# Patient Record
Sex: FEMALE | Race: BLACK OR AFRICAN AMERICAN | NOT HISPANIC OR LATINO | Employment: UNEMPLOYED | URBAN - METROPOLITAN AREA
[De-identification: names, ages, dates, MRNs, and addresses within clinical notes are randomized per-mention and may not be internally consistent; named-entity substitution may affect disease eponyms.]

---

## 2020-04-08 ENCOUNTER — HISTORICAL (OUTPATIENT)
Dept: ADMINISTRATIVE | Facility: HOSPITAL | Age: 20
End: 2020-04-08

## 2020-04-10 LAB — FINAL CULTURE: NO GROWTH

## 2020-09-01 ENCOUNTER — HISTORICAL (OUTPATIENT)
Dept: ADMINISTRATIVE | Facility: HOSPITAL | Age: 20
End: 2020-09-01

## 2020-09-04 LAB — FINAL CULTURE: NORMAL

## 2022-04-10 ENCOUNTER — HISTORICAL (OUTPATIENT)
Dept: ADMINISTRATIVE | Facility: HOSPITAL | Age: 22
End: 2022-04-10

## 2022-04-29 VITALS
WEIGHT: 293 LBS | BODY MASS INDEX: 45.99 KG/M2 | SYSTOLIC BLOOD PRESSURE: 128 MMHG | DIASTOLIC BLOOD PRESSURE: 66 MMHG | HEIGHT: 67 IN | OXYGEN SATURATION: 98 %

## 2022-10-25 ENCOUNTER — HOSPITAL ENCOUNTER (EMERGENCY)
Facility: HOSPITAL | Age: 22
Discharge: HOME | End: 2022-10-25
Payer: MEDICAID

## 2022-10-25 VITALS
TEMPERATURE: 99 F | SYSTOLIC BLOOD PRESSURE: 113 MMHG | DIASTOLIC BLOOD PRESSURE: 69 MMHG | HEART RATE: 80 BPM | RESPIRATION RATE: 18 BRPM

## 2022-10-25 LAB
FLUAV AG UPPER RESP QL IA.RAPID: NOT DETECTED
FLUBV AG UPPER RESP QL IA.RAPID: NOT DETECTED
SARS-COV-2 RNA RESP QL NAA+PROBE: NOT DETECTED
STREP A PCR (OHS): NOT DETECTED

## 2022-10-25 PROCEDURE — 99283 EMERGENCY DEPT VISIT LOW MDM: CPT

## 2022-10-25 PROCEDURE — 87651 STREP A DNA AMP PROBE: CPT | Performed by: PHYSICIAN ASSISTANT

## 2022-10-25 PROCEDURE — 0241U COVID/FLU A&B PCR: CPT | Performed by: PHYSICIAN ASSISTANT

## 2022-10-25 NOTE — FIRST PROVIDER EVALUATION
Medical screening examination initiated.  I have conducted a focused provider triage encounter, findings are as follows:    Brief history of present illness:  23 yo female presents to ED for evaluation of cough, congestion, and sorethroat for the past several days.     Vitals:    10/25/22 1314   BP: 113/69   Pulse: 80   Resp: 18   Temp: 98.6 °F (37 °C)   TempSrc: Oral       Pertinent physical exam:  Patient is awake and alert and oriented.  Ambulatory to triage.  In no acute distress.        Brief workup plan:  COVID/flu, strep    Preliminary workup initiated; this workup will be continued and followed by the physician or advanced practice provider that is assigned to the patient when roomed.

## 2024-04-23 ENCOUNTER — HOSPITAL ENCOUNTER (EMERGENCY)
Facility: HOSPITAL | Age: 24
Discharge: HOME | End: 2024-04-23
Attending: EMERGENCY MEDICINE
Payer: MEDICAID

## 2024-04-23 VITALS
HEART RATE: 72 BPM | OXYGEN SATURATION: 100 % | SYSTOLIC BLOOD PRESSURE: 132 MMHG | DIASTOLIC BLOOD PRESSURE: 85 MMHG | BODY MASS INDEX: 29.55 KG/M2 | WEIGHT: 195 LBS | TEMPERATURE: 99 F | HEIGHT: 68 IN | RESPIRATION RATE: 18 BRPM

## 2024-04-23 DIAGNOSIS — S00.83XA CONTUSION OF FACE, INITIAL ENCOUNTER: ICD-10-CM

## 2024-04-23 DIAGNOSIS — S76.011A HIP STRAIN, RIGHT, INITIAL ENCOUNTER: ICD-10-CM

## 2024-04-23 DIAGNOSIS — S40.011A CONTUSION OF RIGHT SHOULDER, INITIAL ENCOUNTER: ICD-10-CM
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
## 2024-04-23 DIAGNOSIS — S00.11XA PERIORBITAL ECCHYMOSIS OF RIGHT EYE, INITIAL ENCOUNTER: ICD-10-CM

## 2024-04-23 LAB
B-HCG UR QL: NEGATIVE
CTP QC/QA: YES

## 2024-04-23 PROCEDURE — 25000003 PHARM REV CODE 250: Performed by: EMERGENCY MEDICINE

## 2024-04-23 PROCEDURE — 99285 EMERGENCY DEPT VISIT HI MDM: CPT | Mod: 25

## 2024-04-23 RX ORDER — KETOROLAC TROMETHAMINE 10 MG/1
10 TABLET, FILM COATED ORAL
Status: COMPLETED | OUTPATIENT
Start: 2024-04-23 | End: 2024-04-23

## 2024-04-23 RX ORDER — KETOROLAC TROMETHAMINE 30 MG/ML
30 INJECTION, SOLUTION INTRAMUSCULAR; INTRAVENOUS
Status: DISCONTINUED | OUTPATIENT
Start: 2024-04-23 | End: 2024-04-23

## 2024-04-23 RX ORDER — KETOROLAC TROMETHAMINE 10 MG/1
10 TABLET, FILM COATED ORAL EVERY 6 HOURS PRN
Qty: 20 TABLET | Refills: 0 | Status: SHIPPED | OUTPATIENT
Start: 2024-04-23 | End: 2024-04-28

## 2024-04-23 RX ORDER — HYDROCODONE BITARTRATE AND ACETAMINOPHEN 5; 325 MG/1; MG/1
1 TABLET ORAL EVERY 4 HOURS PRN
Qty: 12 TABLET | Refills: 0 | Status: SHIPPED | OUTPATIENT
Start: 2024-04-23 | End: 2024-04-26

## 2024-04-23 RX ADMIN — KETOROLAC TROMETHAMINE 10 MG: 10 TABLET, FILM COATED ORAL at 11:04

## 2024-04-23 NOTE — LETTER
"Rodi "Michelle"Jerald was seen and treated in our emergency department on 4/23/2024.  She may return to work on 04/26/2024.       If you have any questions or concerns, please don't hesitate to call.      trent oro RN    "

## 2024-04-23 NOTE — ED PROVIDER NOTES
Encounter Date: 4/23/2024    SCRIBE #1 NOTE: I, Skip Rodriguez, am scribing for, and in the presence of,  Joellen Blake MD. I have scribed the following portions of the note - Other sections scribed: HPI, ROS.       History     Chief Complaint   Patient presents with    Fall     Ambulatory into triage; reports fall from horse on Sunday. Presents with bruising to right face, reports scattered right sided brush burns. States presents to ED due to increased right hip pain causing her to limp and right shoulder skin burning from abrasion. GCS 15. No obvious deformities.     23 year old female with no significant pmhx presents to the ED for a fall that occurred on Sunday, 4/21.  The patient reports associated symptoms of pain in her right cheek, chin, right shoulder, and right hip.  The patient reports that she was riding a horse when the horse took off and lost control.  She states that she fell off the side of the horse onto the road, where she impacted her entire right side.  She reports that her chin was bleeding after the event.  She states that this occurred next to a nursing home where they sterilized her wound and she found a ride home.      The history is provided by the patient. No  was used.   Fall  The accident occurred two days ago. The point of impact was the right hip, right shoulder and face. The pain is present in the right hip, face and right shoulder.     Review of patient's allergies indicates:   Allergen Reactions    Pcn [penicillins] Itching     Past Medical History:   Diagnosis Date    No pertinent past medical history      No past surgical history on file.  No family history on file.     Review of Systems   Musculoskeletal:         Pain in the right cheek, chin, right shoulder, and right hip       Physical Exam     Initial Vitals [04/23/24 0901]   BP Pulse Resp Temp SpO2   129/84 67 14 98.6 °F (37 °C) 98 %      MAP       --         Physical Exam    Nursing note and  vitals reviewed.  Constitutional: She appears well-developed and well-nourished. She is not diaphoretic. She does not appear ill. No distress.   HENT:   Head: Normocephalic.   Right Ear: External ear normal.   Left Ear: External ear normal.   Mouth/Throat: Oropharynx is clear and moist.   Right periorbital swelling and ecchymosis that extends to right cheek with tenderness, small puncture wound to cheek with scab, small abrasions and ecchymosis to submandibular area    Eyes: Conjunctivae and EOM are normal. Pupils are equal, round, and reactive to light.   Neck: Neck supple. No tracheal deviation present.   No cervical spine tenderness    Normal range of motion.  Cardiovascular:  Normal rate, regular rhythm and normal heart sounds.           No murmur heard.  Pulmonary/Chest: Breath sounds normal. No respiratory distress. She has no wheezes.   Abdominal: Abdomen is soft. She exhibits no distension. There is no abdominal tenderness.   No right CVA tenderness.  No left CVA tenderness.   Musculoskeletal:      Cervical back: Normal range of motion and neck supple.      Comments: Large abrasion to right shoulder, mild swelling and trace ecchymosis to right anterior shoulder, pain with ROM of right shoulder     Tenderness to right hip      Neurological: She is alert and oriented to person, place, and time. GCS score is 15. GCS eye subscore is 4. GCS verbal subscore is 5. GCS motor subscore is 6.   Skin: Skin is warm and dry. Capillary refill takes less than 2 seconds. No rash noted. No pallor.         ED Course   Procedures  Labs Reviewed   POCT URINE PREGNANCY          Imaging Results              CT Maxillofacial Without Contrast (Final result)  Result time 04/23/24 11:25:31      Final result by Angel Persaud MD (04/23/24 11:25:31)                   Impression:      No facial fractures.      Electronically signed by: Angel Persaud  Date:    04/23/2024  Time:    11:25               Narrative:    EXAMINATION:  CT  MAXILLOFACIAL WITHOUT CONTRAST    CLINICAL HISTORY:  Facial trauma, blunt;    TECHNIQUE:  Helical acquisition through the maxillofacial bones without IV contrast. Three plane reconstructions were made available for review.  mGycm. Automatic exposure control, adjustment of mA/kV or iterative reconstruction technique was used to reduce radiation.    COMPARISON:  None available.    FINDINGS:  No facial fractures are identified. Paranasal sinuses and mastoid air cells are clear.                                       CT Head Without Contrast (Final result)  Result time 04/23/24 11:22:14      Final result by Suma Ashley MD (04/23/24 11:22:14)                   Impression:      No acute intracranial abnormality.      Electronically signed by: Suma Ashley  Date:    04/23/2024  Time:    11:22               Narrative:    EXAMINATION:  CT HEAD WITHOUT CONTRAST    CLINICAL HISTORY:  Facial trauma, blunt;    TECHNIQUE:  Axial scans were obtained from skull base to the vertex.    Coronal and sagittal reconstructions obtained from the axial data.    Automatic exposure control was utilized to limit radiation dose.    Contrast: None    Radiation Dose:    Total DLP: 991 mGy*cm    COMPARISON:  None    FINDINGS:  There is no acute intracranial hemorrhage or edema. The gray-white matter differentiation is preserved.    There is no mass effect or midline shift. The ventricles and sulci are normal in size. The basal cisterns are patent. There is no abnormal extra-axial fluid collection.    The calvarium and skull base are intact. The visualized paranasal sinuses and the mastoid air cells are clear.                                       X-Ray Hip 2 or 3 views Right (with Pelvis when performed) (Final result)  Result time 04/23/24 11:05:39      Final result by Fernie Horton MD (04/23/24 11:05:39)                   Impression:      No abnormalities are demonstrated.      Electronically signed by: Fernie Horton  MD  Date:    04/23/2024  Time:    11:05               Narrative:    EXAMINATION:  XR HIP WITH PELVIS WHEN PERFORMED, 2 OR 3  VIEWS RIGHT    CLINICAL HISTORY:  right hip pain;    TECHNIQUE:  AP view of the pelvis and frog leg lateral view of the right hip were performed.    COMPARISON:  None    FINDINGS:  There are no fractures seen.  There is no dislocation.  There are no bony lesions noted.                                       X-ray Shoulder 2 or More Views Right (Final result)  Result time 04/23/24 11:04:00      Final result by Fernie Horton MD (04/23/24 11:04:00)                   Impression:      No acute abnormalities are seen      Electronically signed by: Fernie Horton MD  Date:    04/23/2024  Time:    11:04               Narrative:    EXAMINATION:  XR SHOULDER COMPLETE 2 OR MORE VIEWS RIGHT    CLINICAL HISTORY:  right shoulder;    TECHNIQUE:  Two or three views of the right shoulder were performed.    COMPARISON:  None    FINDINGS:  There are no fractures seen.  There is no dislocation.  There are no bony lesions noted.                                    X-Rays:   Independently Interpreted Readings:   Other Readings:  XR right shoulder: no acute fracture or dislocation  XR right hip: no acute fracture or dislocation     Medications   ketorolac tablet 10 mg (10 mg Oral Given 4/23/24 1131)     Medical Decision Making  The differential diagnoses includes but is not limited to: fracture, contusion, dislocation.      XR and CT without fracture  Rx Toradol and [place]      Problems Addressed:  Contusion of face, initial encounter: acute illness or injury that poses a threat to life or bodily functions  Contusion of right shoulder, initial encounter: acute illness or injury that poses a threat to life or bodily functions  Fall from horse, initial encounter: acute illness or injury that poses a threat to life or bodily functions  Hip strain, right, initial encounter: acute illness or injury that poses a threat to  life or bodily functions  Periorbital ecchymosis of right eye, initial encounter: acute illness or injury that poses a threat to life or bodily functions    Amount and/or Complexity of Data Reviewed  Radiology: ordered and independent interpretation performed. Decision-making details documented in ED Course.    Risk  Prescription drug management.            Scribe Attestation:   Scribe #1: I performed the above scribed service and the documentation accurately describes the services I performed. I attest to the accuracy of the note.    Attending Attestation:           Physician Attestation for Scribe:  Physician Attestation Statement for Scribe #1: I, Joellen Blake MD, reviewed documentation, as scribed by Skip Rodriguez in my presence, and it is both accurate and complete.                                    Clinical Impression:  Final diagnoses:  [V80.010A] Fall from horse, initial encounter (Primary)  [S00.83XA] Contusion of face, initial encounter  [S00.11XA] Periorbital ecchymosis of right eye, initial encounter  [S40.011A] Contusion of right shoulder, initial encounter  [S76.011A] Hip strain, right, initial encounter          ED Disposition Condition    Discharge Stable          ED Prescriptions       Medication Sig Dispense Start Date End Date Auth. Provider    ketorolac (TORADOL) 10 mg tablet Take 1 tablet (10 mg total) by mouth every 6 (six) hours as needed for Pain. Do not take any other NSAIDs 20 tablet 4/23/2024 4/28/2024 Joellen Blake MD    HYDROcodone-acetaminophen (NORCO) 5-325 mg per tablet Take 1 tablet by mouth every 4 (four) hours as needed for Pain. 12 tablet 4/23/2024 4/26/2024 Joellen Blake MD          Follow-up Information       Follow up With Specialties Details Why Contact Info    [facility] - Emergency Dept Emergency Medicine  As needed, If symptoms worsen [address] [address] [id number] [phone]             Joellen Blake MD  04/23/24  1213

## 2024-07-08 ENCOUNTER — LAB VISIT (OUTPATIENT)
Dept: LAB | Facility: HOSPITAL | Age: 24
End: 2024-07-08
Attending: OBSTETRICS & GYNECOLOGY
Payer: MEDICAID

## 2024-07-08 DIAGNOSIS — E34.9 ENDOCRINE DISORDER RELATED TO PUBERTY: Primary | ICD-10-CM

## 2024-07-08 LAB — T PALLIDUM AB SER QL: NONREACTIVE

## 2024-07-08 PROCEDURE — 80074 ACUTE HEPATITIS PANEL: CPT

## 2024-07-08 PROCEDURE — 87389 HIV-1 AG W/HIV-1&-2 AB AG IA: CPT

## 2024-07-08 PROCEDURE — 86780 TREPONEMA PALLIDUM: CPT

## 2024-07-08 PROCEDURE — 36415 COLL VENOUS BLD VENIPUNCTURE: CPT

## 2024-07-08 PROCEDURE — 86695 HERPES SIMPLEX TYPE 1 TEST: CPT

## 2024-07-09 LAB
HAV IGM SERPL QL IA: NONREACTIVE
HBV CORE IGM SERPL QL IA: NONREACTIVE
HBV SURFACE AG SERPL QL IA: NONREACTIVE
HCV AB SERPL QL IA: NONREACTIVE
HIV 1+2 AB+HIV1 P24 AG SERPL QL IA: NONREACTIVE

## 2024-07-10 LAB
HSV1 IGG SERPL QL IA: NEGATIVE
HSV2 IGG SERPL QL IA: NEGATIVE
PATH REV: NORMAL

## 2025-03-07 ENCOUNTER — HOSPITAL ENCOUNTER (EMERGENCY)
Facility: HOSPITAL | Age: 25
Discharge: HOME | End: 2025-03-07
Attending: INTERNAL MEDICINE
Payer: MEDICAID

## 2025-03-07 VITALS
TEMPERATURE: 98 F | HEIGHT: 68 IN | DIASTOLIC BLOOD PRESSURE: 57 MMHG | RESPIRATION RATE: 16 BRPM | BODY MASS INDEX: 30.92 KG/M2 | SYSTOLIC BLOOD PRESSURE: 100 MMHG | OXYGEN SATURATION: 100 % | HEART RATE: 60 BPM | WEIGHT: 204 LBS

## 2025-03-07 DIAGNOSIS — O23.599 BACTERIAL VAGINOSIS IN PREGNANCY: ICD-10-CM

## 2025-03-07 DIAGNOSIS — O21.0 HYPEREMESIS GRAVIDARUM: Primary | ICD-10-CM

## 2025-03-07 DIAGNOSIS — B96.89 BACTERIAL VAGINOSIS IN PREGNANCY: ICD-10-CM

## 2025-03-07 LAB
BACTERIA #/AREA URNS AUTO: ABNORMAL /HPF
BILIRUB UR QL STRIP.AUTO: NEGATIVE
CLARITY UR: ABNORMAL
CLUE CELLS VAG QL WET PREP: ABNORMAL
COLOR UR AUTO: ABNORMAL
GLUCOSE UR QL STRIP: NORMAL
HGB UR QL STRIP: NEGATIVE
HYALINE CASTS #/AREA URNS LPF: ABNORMAL /LPF
KETONES UR QL STRIP: NEGATIVE
LEUKOCYTE ESTERASE UR QL STRIP: 250
NITRITE UR QL STRIP: NEGATIVE
PH UR STRIP: 7 [PH]
PROT UR QL STRIP: NEGATIVE
RBC #/AREA URNS AUTO: ABNORMAL /HPF
SP GR UR STRIP.AUTO: 1.01 (ref 1–1.03)
SQUAMOUS #/AREA URNS LPF: ABNORMAL /HPF
T VAGINALIS VAG QL WET PREP: ABNORMAL
UROBILINOGEN UR STRIP-ACNC: NORMAL
WBC #/AREA URNS AUTO: ABNORMAL /HPF
WBC #/AREA VAG WET PREP: ABNORMAL
YEAST SPEC QL WET PREP: ABNORMAL

## 2025-03-07 PROCEDURE — 25000003 PHARM REV CODE 250

## 2025-03-07 PROCEDURE — 99283 EMERGENCY DEPT VISIT LOW MDM: CPT

## 2025-03-07 PROCEDURE — 81001 URINALYSIS AUTO W/SCOPE: CPT

## 2025-03-07 PROCEDURE — 87210 SMEAR WET MOUNT SALINE/INK: CPT

## 2025-03-07 RX ORDER — ONDANSETRON 4 MG/1
4 TABLET, ORALLY DISINTEGRATING ORAL
Status: COMPLETED | OUTPATIENT
Start: 2025-03-07 | End: 2025-03-07

## 2025-03-07 RX ORDER — METRONIDAZOLE 7.5 MG/G
1 GEL VAGINAL 2 TIMES DAILY
Qty: 70 G | Refills: 0 | Status: SHIPPED | OUTPATIENT
Start: 2025-03-07 | End: 2025-03-14

## 2025-03-07 RX ADMIN — ONDANSETRON 4 MG: 4 TABLET, ORALLY DISINTEGRATING ORAL at 12:03

## 2025-03-07 NOTE — DISCHARGE INSTRUCTIONS
your prescription of zofran from the drug store  Use a pressure-point wrist band or acupuncture as directed by your doctor. Pressure-points and acupuncture may help an upset stomach.  Eat small meals or snacks slowly, every 1 to 2 hours. Try to eat before you feel hungry or as soon as you start to feel hungry. Having an empty stomach can make nausea worse.  It may help to eat a snack in the middle of the night, or first thing in the morning before getting out of bed. Try eating things like nuts, toast, pretzels, crackers, or cereal. Peppermints, peppermint tea, and foods with nikhil may also help. Some people prefer cold food over hot as there are fewer odors.  Keep track of what you eat. This can help you find out what foods you tolerate best. You may want to avoid spicy, fatty, and sweet foods. You can also keep track of other things that trigger your nausea like smells, perfumes, heat, or driving. Try to avoid these things if they make you feel like throwing up.  Drink small amounts of fluids at a time. You may want to try cold, clear liquids. Use a straw or small cup. Drink at least 30 minutes before or after a meal to avoid feeling too full.  Do not lie down right after a meal.  Suck on a hard candy or mints to help control throwing up. The candy or mints may also help with the taste after throwing up.  If possible, open the windows in your room or house to let fresh air in. Turn the exhaust fan on when working inside the kitchen.

## 2025-03-07 NOTE — Clinical Note
"Michelle "Michelle" Jerald was seen and treated in our emergency department on 3/7/2025.  She may return to work on 03/08/2025.       If you have any questions or concerns, please don't hesitate to call.      VIPIN URIBE    "

## 2025-03-07 NOTE — ED PROVIDER NOTES
"Encounter Date: 3/7/2025       History     Chief Complaint   Patient presents with    Morning Sickness     Pt reports she is pregnant and having persistent NV X 2 WKS. LMP 25. .      4-year-old female with no past medical history presents with complaints of nausea and vomiting since finding out that she is pregnant.  Her last menstrual period was 2025.  Her nausea and vomiting have increased over the past 5 weeks.  She did contact her OB doctor Nette, he sent in a prescription of Zofran, due to the medication beginning with an "0" she did not go and fill it.  She denies abdominal pain, diarrhea, weakness, fatigue vaginal bleeding, vaginal discharge dysuria, hematuria, hematochezia.     The history is provided by the patient.     Review of patient's allergies indicates:   Allergen Reactions    Pcn [penicillins] Itching     Past Medical History:   Diagnosis Date    No pertinent past medical history      History reviewed. No pertinent surgical history.  No family history on file.  Social History[1]  Review of Systems   Constitutional: Negative.    HENT: Negative.     Eyes: Negative.    Respiratory: Negative.     Cardiovascular: Negative.    Gastrointestinal:  Positive for nausea and vomiting. Negative for abdominal distention, abdominal pain, anal bleeding, blood in stool, constipation, diarrhea and rectal pain.   Genitourinary: Negative.    Musculoskeletal: Negative.    Skin: Negative.    Neurological: Negative.    All other systems reviewed and are negative.      Physical Exam     Initial Vitals [25 1204]   BP Pulse Resp Temp SpO2   (!) 107/56 63 16 97.5 °F (36.4 °C) 100 %      MAP       --         Physical Exam    Nursing note and vitals reviewed.  Constitutional: Vital signs are normal. She appears well-developed and well-nourished. She is not diaphoretic. She is cooperative.  Non-toxic appearance. She does not have a sickly appearance. She does not appear ill. No distress.   Pink, warm, " no acute distress   HENT:   Head: Normocephalic and atraumatic.   Right Ear: Hearing normal.   Left Ear: Hearing normal.   Nose: Nose normal. Mouth/Throat: Uvula is midline, oropharynx is clear and moist and mucous membranes are normal.   Eyes: Conjunctivae and EOM are normal. Pupils are equal, round, and reactive to light.   Neck: Trachea normal and phonation normal. Neck supple.   Normal range of motion.  Cardiovascular:  Normal rate, regular rhythm, S1 normal, S2 normal, normal heart sounds, intact distal pulses and normal pulses.           Pulmonary/Chest: Effort normal and breath sounds normal. No accessory muscle usage. No tachypnea and no bradypnea. No respiratory distress. She has no decreased breath sounds. She has no wheezes. She has no rhonchi. She has no rales.   Normal effort, symmetrical chest rise and fall, no accessory muscle use. Bilateral clear breath sounds in all fields anterior and posterior.      Abdominal: Abdomen is soft. Bowel sounds are normal. She exhibits no distension. There is no abdominal tenderness.   Abdomen is soft, nontender, no peritoneal signs. Tolerating PO fluids.    There is no rebound and no guarding.   Genitourinary:    Genitourinary Comments: deferred     Musculoskeletal:         General: Normal range of motion.      Cervical back: Normal range of motion and neck supple.     Lymphadenopathy:     She has no cervical adenopathy.   Neurological: She is alert and oriented to person, place, and time. She has normal strength. GCS score is 15. GCS eye subscore is 4. GCS verbal subscore is 5. GCS motor subscore is 6.   Skin: Skin is warm, dry and intact. Capillary refill takes less than 2 seconds. No pallor.   Psychiatric: She has a normal mood and affect. Thought content normal.         ED Course   Procedures  Labs Reviewed   WET PREP, GENITAL - Abnormal       Result Value    WBC, Wet Prep None Seen      Clue Cells, Wet Prep Occasional (*)     Trichomonas, Wet Prep None Seen       Yeast, Wet Prep None Seen     URINALYSIS, REFLEX TO URINE CULTURE - Abnormal    Color, UA Light-Yellow      Appearance, UA Turbid (*)     Specific Gravity, UA 1.010      pH, UA 7.0      Protein, UA Negative      Glucose, UA Normal      Ketones, UA Negative      Blood, UA Negative      Bilirubin, UA Negative      Urobilinogen, UA Normal      Nitrites, UA Negative      Leukocyte Esterase,  (*)     RBC, UA 0-5      WBC, UA 0-5      Bacteria, UA None Seen      Squamous Epithelial Cells, UA Many (*)     Hyaline Casts, UA None Seen            Imaging Results    None          Medications   ondansetron disintegrating tablet 4 mg (4 mg Oral Given 3/7/25 1253)     Medical Decision Making    Morning sickness,  Miscarriage, Threatening miscarriage, Traumatic injury, PID, STD, among others    Ua  Wet Prep clue cells present  Zofran odt: PO challenge tolerating fluids    The patient is a 24 y.o. female who presents to the [facility] Emergency Department with a chief complaint of hyper emesis  and bacterial vaginosis.  EPIC records were reviewed. Lab work was ordered and   reviewed. Findings stated above clue cells positive. The patient was provided with Zofran ODT resulting in complete relief of symptoms. The patient was discharged home with a diagnosis of bacterial vaginosis and hyperemesis . The patient was advised to rest, sip on fluids. It was recommended for the patient to follow up with her OBGYN early next week.  The patient was provided prescriptions for Zofran by her Ob, highly encouraged for her to pick them up at the pharmacy.  The patient's vital signs and condition remained stable while undergoing evaluation in the Emergency Department. The patient agreed with the plan for care.   The patient is nontoxic appearing, in no acute distress, with stable vital signs and resting comfortably. There is no objective evidence requiring urgent intervention or hospitalization. I provided counseling to the patient  with regard to the medical condition, the treatment plan, specific conditions for return, and the importance of follow up. Detailed written and verbal instructions were provided to the patient and he/she expressed a verbal understanding of the discharge instructions and overall management plan. Reiterated the importance of medication administration and safety, advised the patient to follow up with primary care provider in 3-5 days or sooner if needed. All questions and concerns were addressed before leaving the Emergency Department. The patient is stable for discharge.       Amount and/or Complexity of Data Reviewed  Labs:  Decision-making details documented in ED Course.    Risk  Prescription drug management.               ED Course as of 03/07/25 2038   Fri Mar 07, 2025   1315 Wet Prep, Genital(!) [RQ]   1315 Clue Cells, Wet Prep(!): Occasional [RQ]      ED Course User Index  [RQ] Stephie Will FNP                           Clinical Impression:  Final diagnoses:  [O21.0] Hyperemesis gravidarum (Primary)  [O23.599, B96.89] Bacterial vaginosis in pregnancy          ED Disposition Condition    Discharge Stable          ED Prescriptions       Medication Sig Dispense Start Date End Date Auth. Provider    metroNIDAZOLE (METROGEL) 0.75 % (37.5mg/5 gram) vaginal gel Place 1 applicator vaginally 2 (two) times daily. for 7 days 70 g 3/7/2025 3/14/2025 Stephie Will FNP          Follow-up Information       Follow up With Specialties Details Why Contact Info    [facility] - Emergency Dept Emergency Medicine  If symptoms worsen [address] [facility] [id number] [phone]    Dr Chu within the week                   [1]   Social History  Tobacco Use    Smoking status: Former     Types: Cigarettes    Smokeless tobacco: Never   Substance Use Topics    Drug use: Not Currently        Stephie Will FNP  03/07/25 2038

## 2025-04-15 ENCOUNTER — HOSPITAL ENCOUNTER (EMERGENCY)
Facility: HOSPITAL | Age: 25
Discharge: LEFT BEFORE DISCHARGE | End: 2025-04-15
Payer: MEDICAID

## 2025-04-15 VITALS
HEART RATE: 80 BPM | BODY MASS INDEX: 30.61 KG/M2 | SYSTOLIC BLOOD PRESSURE: 148 MMHG | TEMPERATURE: 100 F | DIASTOLIC BLOOD PRESSURE: 86 MMHG | WEIGHT: 195 LBS | OXYGEN SATURATION: 100 % | HEIGHT: 67 IN | RESPIRATION RATE: 18 BRPM

## 2025-04-15 LAB
B-HCG UR QL: POSITIVE
BACTERIA #/AREA URNS AUTO: ABNORMAL /HPF
BILIRUB UR QL STRIP.AUTO: ABNORMAL
CLARITY UR: ABNORMAL
COLOR UR AUTO: ABNORMAL
GLUCOSE UR QL STRIP: ABNORMAL
HGB UR QL STRIP: ABNORMAL
KETONES UR QL STRIP: ABNORMAL
LEUKOCYTE ESTERASE UR QL STRIP: ABNORMAL
NITRITE UR QL STRIP: ABNORMAL
PH UR STRIP: ABNORMAL [PH]
PROT UR QL STRIP: ABNORMAL
RBC #/AREA URNS AUTO: >100 /HPF
SP GR UR STRIP.AUTO: ABNORMAL
SQUAMOUS #/AREA URNS LPF: ABNORMAL /HPF
UROBILINOGEN UR STRIP-ACNC: ABNORMAL
WBC #/AREA URNS AUTO: ABNORMAL /HPF

## 2025-04-15 PROCEDURE — 81001 URINALYSIS AUTO W/SCOPE: CPT

## 2025-04-15 PROCEDURE — 99282 EMERGENCY DEPT VISIT SF MDM: CPT

## 2025-04-15 PROCEDURE — 81025 URINE PREGNANCY TEST: CPT

## 2025-04-15 NOTE — FIRST PROVIDER EVALUATION
"Medical screening examination initiated.  I have conducted a focused provider triage encounter, findings are as follows:    Brief history of present illness:  arrived to ED due to vaginal bleeding and abdominal pain. States pain began 1 week ago but has worsened. OB: L[facility] Chu. 3 months pregnant. .     Vitals:    04/15/25 1653   BP: (!) 148/86   Pulse: 80   Resp: 18   Temp: 99.6 °F (37.6 °C)   TempSrc: Oral   SpO2: 100%   Weight: 88.5 kg (195 lb)   Height: 5' 7" (1.702 m)       Pertinent physical exam:  awake, alert, has non-labored breathing, ambulatory into triage    Brief workup plan:  labs, US     Preliminary workup initiated; this workup will be continued and followed by the physician or advanced practice provider that is assigned to the patient when roomed.  "

## 2025-05-28 ENCOUNTER — LAB VISIT (OUTPATIENT)
Dept: LAB | Facility: HOSPITAL | Age: 25
End: 2025-05-28
Attending: OBSTETRICS & GYNECOLOGY
Payer: MEDICAID

## 2025-05-28 DIAGNOSIS — Z20.2 VENEREAL DISEASE CONTACT: Primary | ICD-10-CM

## 2025-05-28 LAB — T PALLIDUM AB SER QL: NONREACTIVE

## 2025-05-28 PROCEDURE — 86696 HERPES SIMPLEX TYPE 2 TEST: CPT

## 2025-05-28 PROCEDURE — 86780 TREPONEMA PALLIDUM: CPT

## 2025-05-28 PROCEDURE — 80074 ACUTE HEPATITIS PANEL: CPT

## 2025-05-28 PROCEDURE — 87389 HIV-1 AG W/HIV-1&-2 AB AG IA: CPT

## 2025-05-28 PROCEDURE — 36415 COLL VENOUS BLD VENIPUNCTURE: CPT

## 2025-05-30 LAB
HSV1 IGG SERPL QL IA: NEGATIVE
HSV2 IGG SERPL QL IA: POSITIVE
PATH REV: NORMAL

## 2025-06-16 ENCOUNTER — HOSPITAL ENCOUNTER (EMERGENCY)
Facility: HOSPITAL | Age: 25
Discharge: HOME | End: 2025-06-16
Attending: INTERNAL MEDICINE
Payer: MEDICAID

## 2025-06-16 VITALS
DIASTOLIC BLOOD PRESSURE: 77 MMHG | RESPIRATION RATE: 20 BRPM | WEIGHT: 195 LBS | BODY MASS INDEX: 30.61 KG/M2 | SYSTOLIC BLOOD PRESSURE: 101 MMHG | HEART RATE: 124 BPM | OXYGEN SATURATION: 98 % | TEMPERATURE: 99 F | HEIGHT: 67 IN

## 2025-06-16 DIAGNOSIS — S51.811A LACERATION OF RIGHT FOREARM, INITIAL ENCOUNTER: Primary | ICD-10-CM

## 2025-06-16 DIAGNOSIS — S61.519A LACERATION OF WRIST: ICD-10-CM

## 2025-06-16 PROCEDURE — 12001 RPR S/N/AX/GEN/TRNK 2.5CM/<: CPT

## 2025-06-16 PROCEDURE — 99283 EMERGENCY DEPT VISIT LOW MDM: CPT | Mod: 25

## 2025-06-16 PROCEDURE — 63600175 PHARM REV CODE 636 W HCPCS: Performed by: INTERNAL MEDICINE

## 2025-06-16 PROCEDURE — 25000003 PHARM REV CODE 250: Performed by: INTERNAL MEDICINE

## 2025-06-16 RX ORDER — NAPROXEN 500 MG/1
500 TABLET ORAL 2 TIMES DAILY PRN
Qty: 15 TABLET | Refills: 0 | Status: SHIPPED | OUTPATIENT
Start: 2025-06-16

## 2025-06-16 RX ORDER — LIDOCAINE HYDROCHLORIDE 10 MG/ML
10 INJECTION, SOLUTION INFILTRATION; PERINEURAL
Status: COMPLETED | OUTPATIENT
Start: 2025-06-16 | End: 2025-06-16

## 2025-06-16 RX ORDER — BACITRACIN ZINC 500 UNIT/G
OINTMENT (GRAM) TOPICAL 2 TIMES DAILY
Qty: 14 G | Refills: 0 | Status: SHIPPED | OUTPATIENT
Start: 2025-06-16

## 2025-06-16 RX ADMIN — BACITRACIN ZINC, NEOMYCIN, POLYMYXIN B 2 EACH: 400; 3.5; 5 OINTMENT TOPICAL at 03:06

## 2025-06-16 RX ADMIN — LIDOCAINE HYDROCHLORIDE 10 ML: 10 INJECTION, SOLUTION INFILTRATION; PERINEURAL at 02:06

## 2025-06-16 NOTE — ED PROVIDER NOTES
Encounter Date: 6/16/2025       History     Chief Complaint   Patient presents with    Laceration     Small  laceration  to  right  wrist  area.  Patient states  that  it  was  an  accident.  She  cut  herself  with  glass     Presents with a laceration to the right wrist. States cut with a glass. Denies chronic medical problems, last Tdap few years ago.     The history is provided by the patient.     Review of patient's allergies indicates:   Allergen Reactions    Pcn [penicillins] Itching     Past Medical History:   Diagnosis Date    No pertinent past medical history      No past surgical history on file.  No family history on file.  Social History[1]  Review of Systems   Skin:  Positive for wound.       Physical Exam     Initial Vitals [06/16/25 0233]   BP Pulse Resp Temp SpO2   101/77 (!) 124 20 98.6 °F (37 °C) --      MAP       --         Physical Exam    Nursing note and vitals reviewed.  Constitutional: She appears well-developed and well-nourished. No distress.   HENT:   Head: Normocephalic and atraumatic. Mouth/Throat: Oropharynx is clear and moist.   Eyes: Pupils are equal, round, and reactive to light.   Neck:   Normal range of motion.  Cardiovascular:  Normal rate, regular rhythm, normal heart sounds and intact distal pulses.           Pulmonary/Chest: Breath sounds normal.   Musculoskeletal:         General: No edema. Normal range of motion.      Cervical back: Normal range of motion.      Comments: Right wrist, lateral dorsal area small puncture wound (1.5 cm, no active bleeding, noticed, Full AROM, N-V intact     Neurological: She is alert and oriented to person, place, and time. She has normal strength. GCS score is 15. GCS eye subscore is 4. GCS verbal subscore is 5. GCS motor subscore is 6.   Skin: Skin is warm. No rash noted.   Right wrist, lateral dorsal area small puncture wound (1.5 cm, no active bleeding, noticed   Psychiatric: Thought content normal.         ED Course   Lac  Repair    Date/Time: 6/16/2025 3:02 AM    Performed by: Mushtaq Reyes MD  Authorized by: Mushtaq Reyes MD    Consent:     Consent obtained:  Verbal    Consent given by:  Patient    Risks, benefits, and alternatives were discussed: yes      Risks discussed:  Infection, need for additional repair, poor cosmetic result and poor wound healing    Alternatives discussed:  Delayed treatment, no treatment and referral  Universal protocol:     Procedure explained and questions answered to patient or proxy's satisfaction: yes      Relevant documents present and verified: yes      Test results available: yes      Imaging studies available: yes      Required blood products, implants, devices, and special equipment available: no      Site/side marked: no      Immediately prior to procedure, a time out was called: yes      Patient identity confirmed:  Verbally with patient  Anesthesia:     Anesthesia method:  Local infiltration    Local anesthetic:  Lidocaine 1% w/o epi  Laceration details:     Location:  Shoulder/arm    Shoulder/arm location:  R lower arm    Length (cm):  2    Depth (mm):  5  Pre-procedure details:     Preparation:  Patient was prepped and draped in usual sterile fashion and imaging obtained to evaluate for foreign bodies  Exploration:     Limited defect created (wound extended): no      Hemostasis achieved with:  Direct pressure    Imaging obtained: x-ray      Imaging outcome: foreign body not noted      Wound exploration: wound explored through full range of motion and entire depth of wound visualized      Wound extent: no areolar tissue violation noted, no fascia violation noted, no foreign bodies/material noted, no muscle damage noted, no nerve damage noted, no tendon damage noted, no underlying fracture noted and no vascular damage noted      Contaminated: no    Treatment:     Area cleansed with:  Povidone-iodine    Amount of cleaning:  Standard    Irrigation solution:   Sterile saline    Irrigation volume:  250    Irrigation method:  Pressure wash    Visualized foreign bodies/material removed: no      Debridement:  None    Undermining:  None    Scar revision: no    Skin repair:     Repair method:  Sutures    Suture size:  4-0    Suture material:  Prolene    Suture technique:  Simple interrupted    Number of sutures:  2  Approximation:     Approximation:  Close  Repair type:     Repair type:  Simple  Post-procedure details:     Dressing:  Antibiotic ointment and sterile dressing    Procedure completion:  Tolerated well, no immediate complications    Labs Reviewed   POCT URINE PREGNANCY          Imaging Results              X-Ray Wrist Complete Right (In process)                   X-Rays:   Independently Interpreted Readings:   Other Readings:  Rt wrist: No FB or abnormalities    Medications   LIDOcaine HCL 10 mg/ml (1%) injection 10 mL (has no administration in time range)   neomycin-bacitracnZn-polymyxnB packet (has no administration in time range)     Medical Decision Making  Amount and/or Complexity of Data Reviewed  Labs: ordered. Decision-making details documented in ED Course.  Radiology: ordered and independent interpretation performed. Decision-making details documented in ED Course.    Risk  OTC drugs.  Prescription drug management.                                      Clinical Impression:  Final diagnoses:  [S61.519A] Laceration of wrist  [S51.811A] Laceration of right forearm, initial encounter (Primary)          ED Disposition Condition    Discharge Stable          ED Prescriptions       Medication Sig Dispense Start Date End Date Auth. Provider    naproxen (NAPROSYN) 500 MG tablet Take 1 tablet (500 mg total) by mouth 2 (two) times daily as needed (Pain). 15 tablet 6/16/2025 -- Mushtaq Reyes MD    bacitracin 500 unit/gram Oint Apply topically 2 (two) times daily. 14 g 6/16/2025 -- Mushtaq Reyes MD          Follow-up Information        Follow up With Specialties Details Why Contact Info    [facility] - Emergency Dept Emergency Medicine Go to  If symptoms worsen [address] [facility] [id number] [phone]    Mushtaq Reyes MD Internal Medicine Go in 1 week For suture removal [address] [facility] [address]  [phone]                   Mushtaq Reyes MD  06/16/25 0307         [1]   Social History  Tobacco Use    Smoking status: Former     Types: Cigarettes    Smokeless tobacco: Never   Substance Use Topics    Drug use: Not Currently        Mushtaq Reyes MD  06/16/25 0304

## 2025-06-24 ENCOUNTER — HOSPITAL ENCOUNTER (EMERGENCY)
Facility: HOSPITAL | Age: 25
Discharge: HOME | End: 2025-06-24
Attending: EMERGENCY MEDICINE
Payer: MEDICAID

## 2025-06-24 VITALS
BODY MASS INDEX: 32.25 KG/M2 | OXYGEN SATURATION: 99 % | DIASTOLIC BLOOD PRESSURE: 62 MMHG | HEART RATE: 70 BPM | HEIGHT: 67 IN | TEMPERATURE: 98 F | SYSTOLIC BLOOD PRESSURE: 122 MMHG | WEIGHT: 205.5 LBS | RESPIRATION RATE: 20 BRPM

## 2025-06-24 DIAGNOSIS — Z48.02 VISIT FOR SUTURE REMOVAL: Primary | ICD-10-CM

## 2025-06-24 PROCEDURE — 99999 HC NO LEVEL OF SERVICE - ED ONLY: CPT

## 2025-06-24 NOTE — ED PROVIDER NOTES
Encounter Date: 6/24/2025       History     Chief Complaint   Patient presents with    Suture / Staple Removal     Suture removal from right posterior wrist     Patient presents to ED for suture removal. Sutures placed approximately 1 week ago. Patient states it is healing well, denies swelling, erythema, or discharge. Location: right medial wrist. Patient is not taking any blood thinners.      The history is provided by the patient.     Review of patient's allergies indicates:   Allergen Reactions    Pcn [penicillins] Itching     Past Medical History:   Diagnosis Date    No pertinent past medical history      History reviewed. No pertinent surgical history.  No family history on file.  Social History[1]  Review of Systems   Constitutional:  Negative for chills and fever.   Eyes:  Negative for visual disturbance.   Respiratory:  Negative for shortness of breath.    Cardiovascular:  Negative for chest pain.   Gastrointestinal:  Negative for nausea and vomiting.   Genitourinary:  Negative for difficulty urinating and dysuria.   Musculoskeletal:  Negative for arthralgias.   Skin:  Positive for wound. Negative for color change and rash.   Neurological:  Negative for weakness and headaches.   Hematological:  Does not bruise/bleed easily.   Psychiatric/Behavioral:  Negative for confusion.    All other systems reviewed and are negative.      Physical Exam     Initial Vitals [06/24/25 1112]   BP Pulse Resp Temp SpO2   122/62 70 20 98.3 °F (36.8 °C) 99 %      MAP       --         Physical Exam    Nursing note and vitals reviewed.  Constitutional: She appears well-developed and well-nourished.   HENT:   Head: Normocephalic and atraumatic.   Right Ear: External ear normal.   Left Ear: External ear normal.   Nose: Nose normal. Mouth/Throat: Oropharynx is clear and moist.   Eyes: Conjunctivae and EOM are normal.   Neck: Neck supple.   Cardiovascular:  Normal rate, regular rhythm and normal heart sounds.     Exam reveals no  gallop and no friction rub.       No murmur heard.  Pulmonary/Chest: Breath sounds normal. No respiratory distress. She has no wheezes. She has no rhonchi. She has no rales.   Musculoskeletal:      Cervical back: Neck supple.     Neurological: She is alert and oriented to person, place, and time. GCS score is 15. GCS eye subscore is 4. GCS verbal subscore is 5. GCS motor subscore is 6.   Skin: Skin is warm and dry. Capillary refill takes less than 2 seconds.   Well-healing laceration to right wrist. 2 sutures in place. No drainage or discharge noted.          ED Course   Suture Removal    Date/Time: 6/24/2025 11:24 AM  Location procedure was performed: [facility] EMERGENCY DEPARTMENT    Performed by: Concepcion Ramos PA  Authorized by: Ryan Recio MD  Body area: upper extremity  Location details: right wrist  Wound Appearance: clean, well healed, normal color, nontender and no drainage  Sutures Removed: 2  Staples Removed: 0  Post-removal: no dressing applied  Facility: sutures placed in this facility  Complications: No  Estimated blood loss (mL): 0  Specimens: No  Implants: No  Patient tolerance: Patient tolerated the procedure well with no immediate complications        Labs Reviewed - No data to display       Imaging Results    None          Medications - No data to display  Medical Decision Making  Patient presents to ED for suture removal. Sutures placed approximately 1 week ago. Patient states it is healing well, denies swelling, erythema, or discharge. Location: right medial wrist. Patient is not taking any blood thinners.      Sutures removed.     Clinical impression:  Visit for suture removal (Primary)    Patient is non-toxic appearing and tolerating nutritional intake. Patient's vital signs and clinical condition are stable for DC with ED Prescriptions    None        Follow-up: PCP or Internal medicine clinic within 3 days  Referrals made: none    Strict follow-up precautions given. Patient  verbalizes understanding of treatment plan and ED return precautions.         Risk  Risk Details: Given strict ED return precautions. I have spoken with the patient and/or caregivers. I have explained the patient's condition, diagnoses and treatment plan based on the information available to me at this time. I have answered the patient's and/or caregiver's questions and addressed any concerns. The patient and/or caregivers have as good an understanding of the patient's diagnosis, condition and treatment plan as can be expected at this point. The patient's condition is stable and appropriate for discharge from the emergency department.      The patient will pursue further outpatient evaluation with the primary care physician or other designated or consulting physician as outlined in the discharge instructions. The patient and/or caregivers are agreeable to this plan of care and follow-up instructions have been explained in detail. The patient and/or caregivers have received these instructions in written format and have expressed an understanding of the discharge instructions. The patient and/or caregivers are aware that any significant change in condition or worsening of symptoms should prompt an immediate return to this or the closest emergency department or a call to 911.               Additional MDM:   Differential Diagnosis:   Other: The following diagnoses were also considered and will be evaluated: Contusion, Strain and Sprain.                                   Clinical Impression:  Final diagnoses:  [Z48.02] Visit for suture removal (Primary)          ED Disposition Condition    Discharge Stable          ED Prescriptions    None       Follow-up Information       Follow up With Specialties Details Why Contact Info    [facility] - Emergency Dept Emergency Medicine Go to  If symptoms worsen, As needed [address] [facility] [id number] [phone]    Your primary care provider  Go in 3  days                     [1]   Social History  Tobacco Use    Smoking status: Former     Types: Cigarettes    Smokeless tobacco: Never   Substance Use Topics    Drug use: Not Currently        Concepcion Ramos, PA  06/24/25 1121